# Patient Record
Sex: MALE | Race: OTHER | HISPANIC OR LATINO | Employment: FULL TIME | RURAL
[De-identification: names, ages, dates, MRNs, and addresses within clinical notes are randomized per-mention and may not be internally consistent; named-entity substitution may affect disease eponyms.]

---

## 2024-10-07 ENCOUNTER — HOSPITAL ENCOUNTER (EMERGENCY)
Facility: HOSPITAL | Age: 24
Discharge: HOME | End: 2024-10-07

## 2024-10-07 VITALS
OXYGEN SATURATION: 99 % | BODY MASS INDEX: 24.59 KG/M2 | DIASTOLIC BLOOD PRESSURE: 84 MMHG | RESPIRATION RATE: 16 BRPM | SYSTOLIC BLOOD PRESSURE: 142 MMHG | HEIGHT: 71 IN | HEART RATE: 83 BPM | TEMPERATURE: 98 F | WEIGHT: 175.63 LBS

## 2024-10-07 DIAGNOSIS — M54.50 ACUTE MIDLINE LOW BACK PAIN WITHOUT SCIATICA: Primary | ICD-10-CM

## 2024-10-07 PROCEDURE — 96372 THER/PROPH/DIAG INJ SC/IM: CPT | Performed by: NURSE PRACTITIONER

## 2024-10-07 PROCEDURE — 99284 EMERGENCY DEPT VISIT MOD MDM: CPT | Mod: 25

## 2024-10-07 PROCEDURE — 63600175 PHARM REV CODE 636 W HCPCS: Performed by: NURSE PRACTITIONER

## 2024-10-07 PROCEDURE — 99284 EMERGENCY DEPT VISIT MOD MDM: CPT | Mod: ,,, | Performed by: NURSE PRACTITIONER

## 2024-10-07 RX ORDER — TIZANIDINE 4 MG/1
4 TABLET ORAL EVERY 8 HOURS PRN
Qty: 20 TABLET | Refills: 0 | Status: SHIPPED | OUTPATIENT
Start: 2024-10-07 | End: 2024-10-17

## 2024-10-07 RX ORDER — KETOROLAC TROMETHAMINE 30 MG/ML
30 INJECTION, SOLUTION INTRAMUSCULAR; INTRAVENOUS
Status: COMPLETED | OUTPATIENT
Start: 2024-10-07 | End: 2024-10-07

## 2024-10-07 RX ORDER — KETOROLAC TROMETHAMINE 10 MG/1
10 TABLET, FILM COATED ORAL EVERY 12 HOURS PRN
Qty: 10 TABLET | Refills: 0 | Status: SHIPPED | OUTPATIENT
Start: 2024-10-07

## 2024-10-07 RX ADMIN — KETOROLAC TROMETHAMINE 30 MG: 30 INJECTION, SOLUTION INTRAMUSCULAR at 07:10

## 2024-10-07 NOTE — Clinical Note
"Octavio Morganian" Reji was seen and treated in our emergency department on 10/7/2024.  He may return to work on 10/08/2024.       If you have any questions or concerns, please don't hesitate to call.      Sol Bird, ADRIANAP"

## 2024-10-07 NOTE — ED PROVIDER NOTES
Encounter Date: 10/7/2024       History     Chief Complaint   Patient presents with    Back Pain     Pt c/o back pain x2-3 days, does heavy lifting at work, took tylenol this morning. Pain 8/10. Denies difficulty voiding or having bm.       23 yo male with lower back pain x several days. Reports pain exacerbated with twisting movements. Reports heavy lifting at work. No sciatica, urinary complaints, or saddle anesthesia.     The history is provided by the patient.     Review of patient's allergies indicates:  No Known Allergies  History reviewed. No pertinent past medical history.  History reviewed. No pertinent surgical history.  No family history on file.  Social History     Tobacco Use    Smoking status: Never    Smokeless tobacco: Never     Review of Systems   Constitutional:  Negative for chills and fever.   Gastrointestinal:  Negative for abdominal pain, nausea and vomiting.   Genitourinary:  Negative for dysuria.   Musculoskeletal:  Positive for back pain. Negative for gait problem, neck pain and neck stiffness.   Neurological:  Negative for dizziness, weakness and headaches.   Psychiatric/Behavioral:  Negative for confusion.    All other systems reviewed and are negative.      Physical Exam     Initial Vitals [10/07/24 1855]   BP Pulse Resp Temp SpO2   (!) 157/98 83 16 97.5 °F (36.4 °C) 99 %      MAP       --         Physical Exam    Nursing note and vitals reviewed.  Constitutional: He appears well-developed and well-nourished.   HENT:   Head: Normocephalic.   Right Ear: External ear normal.   Left Ear: External ear normal.   Nose: Nose normal. Mouth/Throat: Oropharynx is clear and moist.   Eyes: EOM are normal. Pupils are equal, round, and reactive to light.   Neck: Neck supple.   Normal range of motion.  Cardiovascular:  Normal rate, regular rhythm and normal heart sounds.           Pulmonary/Chest: Breath sounds normal.   Abdominal: Abdomen is soft.   Musculoskeletal:         General: Tenderness present.  Normal range of motion.      Cervical back: Normal, normal range of motion and neck supple.      Thoracic back: Normal.      Lumbar back: Spasms and tenderness present. Negative right straight leg raise test and negative left straight leg raise test.     Neurological: He is alert and oriented to person, place, and time. He has normal strength. GCS score is 15. GCS eye subscore is 4. GCS verbal subscore is 5. GCS motor subscore is 6.   Skin: Skin is warm. Capillary refill takes less than 2 seconds.   Psychiatric: He has a normal mood and affect. His behavior is normal. Judgment and thought content normal.         Medical Screening Exam   See Full Note    ED Course   Procedures  Labs Reviewed - No data to display       Imaging Results              X-Ray Lumbar Spine Ap And Lateral (In process)                      Medications   ketorolac injection 30 mg (30 mg Intramuscular Given 10/7/24 1900)     Medical Decision Making  25 yo male with lower back pain x several days. Reports pain exacerbated with twisting movements. Reports heavy lifting at work. No sciatica, urinary complaints, or saddle anesthesia.     The history is provided by the patient.   Vitals reviewed  Patient requesting xray  Xray with no acute findings  Toradol given in ED  Discharged with Toradol and Zanaflex  Encouraged back stretches  Strict return and follow-up precautions have been given by me personally to the patient/family/caregiver(s).    Data Reviewed/Counseling: I have reviewed the patient's vital signs, nursing notes, and other relevant tests/information. I had a detailed discussion regarding the historical points, exam findings, and any diagnostic results supporting the discharge diagnosis. I also discussed the need for outpatient follow-up and the need to return to the ED if symptoms worsen or if there are any questions or concerns that arise at home.      Amount and/or Complexity of Data Reviewed  Radiology: ordered.    Risk  Prescription  drug management.                                      Clinical Impression:   Final diagnoses:  [M54.50] Acute midline low back pain without sciatica (Primary)               Sol Bird, NIECY  10/07/24 3232

## 2024-10-08 NOTE — DISCHARGE INSTRUCTIONS
Take medication as needed. Do back stretches and then ice several times a day. Follow up with primary care provider as needed.     The examination and treatment you have received in the Emergency Department today have been rendered on an emergency basis only and are not intended to be a substitute for an effort to provide complete medical care. You should contact your follow-up physician as it is important that you let him or her check you and report any new or remaining problems since it is impossible to recognize and treat all elements of an injury or illness in a single emergency care center visit.

## 2024-10-08 NOTE — ED NOTES
Patient discharged to home via private vehicle with family, discharge instructions given with voiced understanding. MARIO